# Patient Record
Sex: FEMALE | Race: OTHER | RURAL
[De-identification: names, ages, dates, MRNs, and addresses within clinical notes are randomized per-mention and may not be internally consistent; named-entity substitution may affect disease eponyms.]

---

## 2017-05-10 NOTE — PATIENT DISCUSSION
(H38.4333) Nonexudative age-related macular degeneration bilateral jaime - Assesment : Examination revealed AMD Dry.-VERY MILD - Plan : Monitor for changes. Advised patient to call our office with decreased vision or increased distortion. Patient advised to check [employer] regularly (once weekly or more) and use nutraceuticals such as AREDS 2 eye vitamins.  Wear sunglasses when outdoors and eat green, leafy vegetables to maintain ocular health. 1 YEAR EXAM / MAC OCT

## 2017-05-10 NOTE — PATIENT DISCUSSION
(P37.405) Dermatochalasis of left upper eyelid - Assesment : Examination revealed Dermatochalasis - Plan : LID EVAL

## 2017-05-10 NOTE — PATIENT DISCUSSION
(C08.269) Dermatochalasis of right upper eyelid - Assesment : Examination revealed Dermatochalasis - Plan : LID EVAL

## 2017-05-10 NOTE — PATIENT DISCUSSION
(H35.371) Puckering of macula, right eye - Assesment : Examination revealed ERM. Stable - Plan : Monitor. Advised patient to call our office with decreased vision or increased symptoms.

## 2018-04-18 NOTE — PATIENT DISCUSSION
(H33.3562) Nonexudative age-related macular degeneration bilateral jaime - Assesment : Examination revealed AMD Dry.-VERY MILD - Plan : Monitor for changes. Advised patient to call our office with decreased vision or increased distortion. Patient advised to check [employer] regularly (once weekly or more) and use nutraceuticals such as AREDS 2 eye vitamins.  Wear sunglasses when outdoors and eat green, leafy vegetables to maintain ocular health. 1 YEAR EXAM / MAC OCT

## 2018-04-18 NOTE — PATIENT DISCUSSION
(H88.422) Vitreous degeneration, bilateral - Assesment : Examination revealed PVD - Plan : Monitor for changes. Advised patient to call our office with decreased vision or an increase in flashes and/or floaters.

## 2019-04-22 NOTE — PATIENT DISCUSSION
(H32.8678) Nonexudative age-related macular degeneration bilateral jaime - Assesment : Examination revealed AMD Dry.-VERY MILD/UNCHANGED - Plan : Monitor for changes. Advised patient to call our office with decreased vision or increased distortion. Patient advised to check [employer] regularly (once weekly or more) and use nutraceuticals such as AREDS 2 eye vitamins.  Wear sunglasses when outdoors and eat green, leafy vegetables to maintain ocular health. 1 YEAR EXAM / MAC OCT

## 2019-04-22 NOTE — PATIENT DISCUSSION
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 1 DD NASAL MACULA - Plan : Monitor for changes.

## 2022-04-18 ENCOUNTER — CONSULTATION/EVALUATION (OUTPATIENT)
Dept: RURAL CLINIC 3 | Facility: CLINIC | Age: 57
End: 2022-04-18

## 2022-04-18 VITALS — WEIGHT: 134 LBS | HEIGHT: 60 IN | BODY MASS INDEX: 26.31 KG/M2

## 2022-04-18 DIAGNOSIS — H25.13: ICD-10-CM

## 2022-04-18 PROCEDURE — 92004 COMPRE OPH EXAM NEW PT 1/>: CPT

## 2022-04-18 ASSESSMENT — VISUAL ACUITY
OS_BAT: 20/40
OD_CC: 20/20
OS_AM: 20/20
OD_SC: 20/30
OS_SC: 20/40
OD_PAM: 20/20
OD_BAT: 20/40
OS_CC: 20/25
OD_CC: 20/30
OS_CC: 20/20

## 2022-04-18 ASSESSMENT — TONOMETRY
OS_IOP_MMHG: 24
OD_IOP_MMHG: 19

## 2022-04-18 NOTE — PATIENT DISCUSSION
(Surgical) Visually Significant (secondary to glare), discussed the risks, benefits, alternatives, and limitations of cataract surgery. The patient stated a full understanding and a desire to proceed with the procedure. The patient will need to return for pre-op appointment with cataract measurements and to have any additional questions answered, and start pre-operative eye drops as directed. Pt elects to proceed with LRI/LenSx OU starting with OS first and then OD after. Discussed lens benefits & advised pt need for reading glasses.

## 2022-05-18 ENCOUNTER — PRE-OP/H&P (OUTPATIENT)
Dept: RURAL CLINIC 3 | Facility: CLINIC | Age: 57
End: 2022-05-18

## 2022-05-18 VITALS
WEIGHT: 134 LBS | BODY MASS INDEX: 26.31 KG/M2 | SYSTOLIC BLOOD PRESSURE: 128 MMHG | HEART RATE: 64 BPM | HEIGHT: 60 IN | DIASTOLIC BLOOD PRESSURE: 72 MMHG

## 2022-05-18 DIAGNOSIS — Z01.818: ICD-10-CM

## 2022-05-18 PROCEDURE — 99499 UNLISTED E&M SERVICE: CPT

## 2022-05-18 ASSESSMENT — VISUAL ACUITY
OS_SC: 20/40
OD_BAT: 20/40
OS_BAT: 20/40
OS_CC: 20/25
OD_CC: 20/30
OD_SC: 20/30
OD_PAM: 20/20
OS_CC: 20/20
OD_CC: 20/20
OS_AM: 20/20

## 2022-06-17 ENCOUNTER — SURGERY/PROCEDURE (OUTPATIENT)
Dept: RURAL CLINIC 4 | Facility: CLINIC | Age: 57
End: 2022-06-17

## 2022-06-17 DIAGNOSIS — H25.12: ICD-10-CM

## 2022-06-17 PROCEDURE — 66984 XCAPSL CTRC RMVL W/O ECP: CPT

## 2022-06-20 ENCOUNTER — POST OP/EVAL OF SECOND EYE (OUTPATIENT)
Dept: RURAL CLINIC 3 | Facility: CLINIC | Age: 57
End: 2022-06-20

## 2022-06-20 VITALS
HEART RATE: 64 BPM | DIASTOLIC BLOOD PRESSURE: 72 MMHG | WEIGHT: 134 LBS | SYSTOLIC BLOOD PRESSURE: 128 MMHG | BODY MASS INDEX: 22.33 KG/M2 | HEIGHT: 65 IN

## 2022-06-20 DIAGNOSIS — Z98.42: ICD-10-CM

## 2022-06-20 PROCEDURE — 99024 POSTOP FOLLOW-UP VISIT: CPT

## 2022-06-20 ASSESSMENT — TONOMETRY
OD_IOP_MMHG: 18
OS_IOP_MMHG: 18

## 2022-06-20 ASSESSMENT — VISUAL ACUITY
OD_BAT: 20/40
OS_SC: 20/60
OS_PH: 20/30
OD_CC: 20/20
OD_CC: 20/30
OD_PAM: 20/20

## 2022-06-20 NOTE — PATIENT DISCUSSION
Medical Clearance done today. No outstanding medical concerns that would preclude surgery and patient is medically cleared to proceed.

## 2022-07-22 ENCOUNTER — POST-OP (OUTPATIENT)
Dept: RURAL CLINIC 3 | Facility: CLINIC | Age: 57
End: 2022-07-22

## 2022-07-22 ENCOUNTER — SURGERY/PROCEDURE (OUTPATIENT)
Dept: RURAL CLINIC 4 | Facility: CLINIC | Age: 57
End: 2022-07-22

## 2022-07-22 DIAGNOSIS — Z98.41: ICD-10-CM

## 2022-07-22 DIAGNOSIS — H25.11: ICD-10-CM

## 2022-07-22 DIAGNOSIS — Z98.42: ICD-10-CM

## 2022-07-22 PROCEDURE — 99024 POSTOP FOLLOW-UP VISIT: CPT

## 2022-07-22 PROCEDURE — 66984 XCAPSL CTRC RMVL W/O ECP: CPT

## 2022-07-22 ASSESSMENT — VISUAL ACUITY
OD_PH: 20/90
OD_SC: 20/300
OS_SC: 20/25

## 2022-07-22 ASSESSMENT — TONOMETRY: OS_IOP_MMHG: 15

## 2022-07-22 NOTE — PATIENT DISCUSSION
Abrasion noted OD with BCL in place. Continue post op drops as directed. F/U on Tuesday for BCL removal. Continue to monitor.

## 2022-07-26 ENCOUNTER — POST-OP (OUTPATIENT)
Dept: RURAL CLINIC 3 | Facility: CLINIC | Age: 57
End: 2022-07-26

## 2022-07-26 DIAGNOSIS — Z98.42: ICD-10-CM

## 2022-07-26 DIAGNOSIS — Z98.41: ICD-10-CM

## 2022-07-26 PROCEDURE — 99024 POSTOP FOLLOW-UP VISIT: CPT

## 2022-07-26 ASSESSMENT — VISUAL ACUITY
OS_SC: 20/25+2
OD_SC: 20/40
OD_PH: 20/30

## 2022-07-26 NOTE — PATIENT DISCUSSION
Patient doing well and stable with BCL in place. Abrasion resolved, BCL removed today. Continue with post-operative drops until completed. Follow up with Dr. Adan Ortiz as scheduled.

## 2022-11-18 NOTE — PATIENT DISCUSSION
Recommend crizal to help with glare . Degeneration is mild and has minimal impact on vision at this time.